# Patient Record
Sex: FEMALE | Race: WHITE | NOT HISPANIC OR LATINO | URBAN - METROPOLITAN AREA
[De-identification: names, ages, dates, MRNs, and addresses within clinical notes are randomized per-mention and may not be internally consistent; named-entity substitution may affect disease eponyms.]

---

## 2017-06-26 PROBLEM — Z00.00 ENCOUNTER FOR PREVENTIVE HEALTH EXAMINATION: Status: ACTIVE | Noted: 2017-06-26

## 2017-09-05 ENCOUNTER — OUTPATIENT (OUTPATIENT)
Dept: OUTPATIENT SERVICES | Facility: HOSPITAL | Age: 42
LOS: 1 days | Discharge: HOME | End: 2017-09-05

## 2017-09-05 DIAGNOSIS — Z12.31 ENCOUNTER FOR SCREENING MAMMOGRAM FOR MALIGNANT NEOPLASM OF BREAST: ICD-10-CM

## 2017-09-20 ENCOUNTER — APPOINTMENT (OUTPATIENT)
Dept: BREAST CENTER | Facility: CLINIC | Age: 42
End: 2017-09-20
Payer: COMMERCIAL

## 2017-09-20 VITALS
HEIGHT: 62 IN | SYSTOLIC BLOOD PRESSURE: 102 MMHG | BODY MASS INDEX: 22.08 KG/M2 | WEIGHT: 120 LBS | DIASTOLIC BLOOD PRESSURE: 60 MMHG

## 2017-09-20 DIAGNOSIS — F17.200 NICOTINE DEPENDENCE, UNSPECIFIED, UNCOMPLICATED: ICD-10-CM

## 2017-09-20 DIAGNOSIS — Z80.0 FAMILY HISTORY OF MALIGNANT NEOPLASM OF DIGESTIVE ORGANS: ICD-10-CM

## 2017-09-20 PROCEDURE — 99213 OFFICE O/P EST LOW 20 MIN: CPT

## 2017-09-20 RX ORDER — BROMPHENIRAMINE MALEATE, PSEUDOEPHEDRINE HYDROCHLORIDE, 2; 30; 10 MG/5ML; MG/5ML; MG/5ML
30-2-10 SYRUP ORAL
Qty: 480 | Refills: 0 | Status: COMPLETED | COMMUNITY
Start: 2017-07-13

## 2017-09-20 RX ORDER — AZITHROMYCIN 250 MG/1
250 TABLET, FILM COATED ORAL
Qty: 6 | Refills: 0 | Status: COMPLETED | COMMUNITY
Start: 2017-07-13

## 2017-09-20 RX ORDER — AMOXICILLIN AND CLAVULANATE POTASSIUM 875; 125 MG/1; MG/1
875-125 TABLET, COATED ORAL
Qty: 20 | Refills: 0 | Status: COMPLETED | COMMUNITY
Start: 2017-04-05

## 2018-03-05 ENCOUNTER — APPOINTMENT (OUTPATIENT)
Dept: BREAST CENTER | Facility: CLINIC | Age: 43
End: 2018-03-05

## 2018-03-07 ENCOUNTER — APPOINTMENT (OUTPATIENT)
Dept: BREAST CENTER | Facility: CLINIC | Age: 43
End: 2018-03-07
Payer: COMMERCIAL

## 2018-03-07 VITALS
OXYGEN SATURATION: 95 % | WEIGHT: 111 LBS | SYSTOLIC BLOOD PRESSURE: 100 MMHG | HEART RATE: 60 BPM | BODY MASS INDEX: 20.43 KG/M2 | HEIGHT: 62 IN | DIASTOLIC BLOOD PRESSURE: 64 MMHG

## 2018-03-07 PROCEDURE — 99213 OFFICE O/P EST LOW 20 MIN: CPT

## 2018-07-24 PROBLEM — Z80.0 FAMILY HISTORY OF COLON CANCER: Status: ACTIVE | Noted: 2017-09-20

## 2018-09-06 ENCOUNTER — FORM ENCOUNTER (OUTPATIENT)
Age: 43
End: 2018-09-06

## 2018-09-07 ENCOUNTER — OUTPATIENT (OUTPATIENT)
Dept: OUTPATIENT SERVICES | Facility: HOSPITAL | Age: 43
LOS: 1 days | Discharge: HOME | End: 2018-09-07

## 2018-09-07 DIAGNOSIS — Z12.31 ENCOUNTER FOR SCREENING MAMMOGRAM FOR MALIGNANT NEOPLASM OF BREAST: ICD-10-CM

## 2018-09-07 DIAGNOSIS — R92.2 INCONCLUSIVE MAMMOGRAM: ICD-10-CM

## 2018-09-12 ENCOUNTER — OTHER (OUTPATIENT)
Age: 43
End: 2018-09-12

## 2018-11-01 ENCOUNTER — APPOINTMENT (OUTPATIENT)
Dept: BREAST CENTER | Facility: CLINIC | Age: 43
End: 2018-11-01
Payer: COMMERCIAL

## 2018-11-01 VITALS
WEIGHT: 111 LBS | HEIGHT: 62 IN | SYSTOLIC BLOOD PRESSURE: 110 MMHG | BODY MASS INDEX: 20.43 KG/M2 | DIASTOLIC BLOOD PRESSURE: 64 MMHG

## 2018-11-01 PROCEDURE — 99212 OFFICE O/P EST SF 10 MIN: CPT

## 2019-03-10 ENCOUNTER — FORM ENCOUNTER (OUTPATIENT)
Age: 44
End: 2019-03-10

## 2019-03-11 ENCOUNTER — OUTPATIENT (OUTPATIENT)
Dept: OUTPATIENT SERVICES | Facility: HOSPITAL | Age: 44
LOS: 1 days | Discharge: HOME | End: 2019-03-11

## 2019-03-11 DIAGNOSIS — R92.8 OTHER ABNORMAL AND INCONCLUSIVE FINDINGS ON DIAGNOSTIC IMAGING OF BREAST: ICD-10-CM

## 2019-03-19 ENCOUNTER — APPOINTMENT (OUTPATIENT)
Dept: BREAST CENTER | Facility: CLINIC | Age: 44
End: 2019-03-19
Payer: COMMERCIAL

## 2019-03-19 VITALS
TEMPERATURE: 98.3 F | HEIGHT: 62 IN | DIASTOLIC BLOOD PRESSURE: 68 MMHG | BODY MASS INDEX: 21.71 KG/M2 | SYSTOLIC BLOOD PRESSURE: 100 MMHG | WEIGHT: 118 LBS

## 2019-03-19 PROCEDURE — 99212 OFFICE O/P EST SF 10 MIN: CPT

## 2019-03-19 NOTE — DATA REVIEWED
[FreeTextEntry1] : EXAM:  MG MAMMO SCREEN BI      \par PROCEDURE DATE:  09/07/2018  \par INTERPRETATION:  HISTORY:\par Patient is 43 years old and is seen for dense breasts. The patient has no \par personal history of cancer.  The patient has a history of right cyst \par aspiration at age 39.   The patient has no family history of breast \par cancer.\par CLINICAL BREAST EXAM:\par The patient reports her last clinical breast exam was performed 4 months \par ago.\par FILMS COMPARED:\par The present examination has been compared to prior imaging studies \par performed at [facility] on 06/30/2016 and \par 09/05/2017.\par MAMMOGRAM FINDINGS:\par The following mammographic views were obtained: bilateral craniocaudal \par and bilateral mediolateral oblique.  Computer-aided detection was \par utilized in the interpretation of this examination.\par The breasts are heterogeneously dense, which may obscure small masses.\par No suspicious masses, calcifications or other abnormalities are seen.\par When compared to prior studies there is no significant change.\par IMPRESSION:\par There is no mammographic evidence of malignancy.\par RECOMMENDATION:\par Unless otherwise indicated by clinical findings, the patient should \par resume annual screening in 1 year.\par ASSESSMENT:\par BI-RADS Category 1:  Negative\par MICHELL HASSAN M.D., ATTENDING RADIOLOGIST\par This document has been electronically signed. Sep  7 2018 10:50AM\par   \par \par   \par \par \par EXAM:  US BREAST COMPLETE BI      \par PROCEDURE DATE:  09/07/2018  \par INTERPRETATION:  Clinical History / Reason for exam: Dense breasts.\par The patient reports her last clinical breast examination was performed: 4 \par months ago.\par Comparisons: 7/7/2016\par Ultrasound:\par Bilateral whole breast ultrasound was performed. \par Within the right breast at the 10:00 axis, 2 cm from nipple, there is a \par hypoechoic oval mass with parallel orientation measuring 1.2 x 1.1 x 0.5 \par cm. Within the left breast at the 1:00-2:00 axis, 6 cm the nipple, there \par is a hypoechoic oval mass with parallel orientation measuring 0.9 x 0.7 x \par 0.4 cm.\par Impression:  \par 1.  Probably benign right breast 10:00, N2 mass for which short interval \par follow-up in 6 months with targeted right breast ultrasound is \par recommended to demonstrate stability.\par 2.  Probably benign left breast 1:00-2:00, N6 mass for which short \par interval follow-up in 6 months with targeted left breast ultrasound is \par recommended to demonstrate stability.\par Recommendation: Follow-up breast ultrasound in 6 months.\par BI-RADS category 3: Probably Benign\par The above findings and recommendations were discussed with the patient at \par the time of the examination. \par MICHELL HASSAN M.D., ATTENDING RADIOLOGIST\par This document has been electronically signed. Sep  7 2018 11:51AM\par   \par \par \par \par EXAM:  US BREAST LIMITED BI      \par PROCEDURE DATE:  03/11/2019  \par INTERPRETATION:  Clinical History / Reason for exam: Follow-up of \par probably benign masses in both breasts\par Comparisons: 9/7/2018\par Ultrasound:\par Bilateral targeted breast ultrasound was performed.\par Within the right breast at the 10:00 axis, 2 cm from nipple, there is a \par stable hypoechoic oval mass with parallel orientation measuring 1.3 x 1.1 \par x 0.5 cm, previously 1.2 x 1.1 x 0.5 cm.\par Within the left breast at the 1:00-2:00 axis, 6 cm the nipple, there is a \par stable hypoechoic oval mass with parallel orientation measuring 0.9 x 0.7 \par x 0.4 cm, previously 0.9 x 0.7 x 0.4 cm.\par Impression:\par Stable ovoid masses in both breasts are probably benign\par Recommendation: Follow-up breast ultrasound in 6 months. The patient will \par be due for a screening mammogram at the same time.\par BI-RADS category 3: Probably Benign\par The above findings and recommendations were discussed with the patient at\par the time of the examination.\par LUPE LAM M.D., ATTENDING RADIOLOGIST\par This document has been electronically signed. Mar 11 2019 12:26PM\par   \par

## 2019-03-19 NOTE — PAST MEDICAL HISTORY
[Menstruating] : The patient is menstruating [Menarche Age ____] : age at menarche was [unfilled] [Definite ___ (Date)] : the last menstrual period was [unfilled] [Total Preg ___] : G[unfilled] [Live Births ___] : P[unfilled]  [Age At Live Birth ___] : Age at live birth: [unfilled] [FreeTextEntry6] : No. [FreeTextEntry7] : Yes; 7 years, stopped 1998 [FreeTextEntry8] : No.

## 2019-03-19 NOTE — HISTORY OF PRESENT ILLNESS
[FreeTextEntry1] : Patient with history of RIght breast benign biopsy in the past; fibrocystic changes.\par Heterogeneously dense breasts.  \par Right breast probably benign hypoechoic oval mass with parallel orientation 10:00 N2, 12 mm seen initially 9/7/18 ultrasound; requiring short term follow up.  \par Left breast probably benign hypoechoic oval mass with parallel orientation 1-2:00 N6, 9 mm seen initially on 9/7/18 ultrasound; requiring short term follow up.\par \par No prior complaints related to the breasts. \par No family history of breast or ovarian cancer. \par \par Federica Model Risk Assessment:\par 5 yr - 1.3 %\par Life - 17.9 %\par \par \par \par

## 2019-03-19 NOTE — ASSESSMENT
[FreeTextEntry1] : 43 year old female with bilateral probably benign masses requiring short term follow up.  She has a history of a benign right breast biopsy in the past demonstrating benign fibrocystic changes.   She has no family history of breast or ovarian cancer and her Federica Model risk assessment for breast cancer is 17.9% in her lifetime.\par \par A bilateral breast ultrasound was performed this past month for short term follow up.  There are stable hypoechoic oval masses, right breast 10:00 N2, 13 mm and left breast 1-2:00 N6, 9 mm, both requiring short term follow up.  \par \par She is here for evaluation of these findings.  At this time, these findings do not present the need for surgical intervention.  She has a benign clinical breast examination and no current complaints related to the breasts. For now, she will need a bilateral diagnostic mammogram and ultrasound for short term follow up, with subsequent follow up clinical breast examination.  I spent a total of 10 minutes of face to face time with this patient, greater than 50% of which was spent in counseling and/or coordination of care.  All of her questions were appropriately answered.\par

## 2019-03-19 NOTE — PHYSICAL EXAM
[No Supraclavicular Adenopathy] : no supraclavicular adenopathy [Examined in the supine and seated position] : examined in the supine and seated position [No dominant masses] : no dominant masses in right breast  [Symmetrical] : symmetrical [No dominant masses] : no dominant masses left breast [No Nipple Retraction] : no left nipple retraction [No Nipple Discharge] : no left nipple discharge [Breast Mass Right Breast ___cm] : no masses [Breast Mass Left Breast ___cm] : no masses [Breast Nipple Inversion] : nipples not inverted [Breast Nipple Retraction] : nipples not retracted [Breast Nipple Flattening] : nipples not flattened [Breast Nipple Fissures] : nipples not fissured [Breast Abnormal Lactation (Galactorrhea)] : no galactorrhea [Breast Abnormal Secretion Bloody Fluid] : no bloody discharge [Breast Abnormal Secretion Serous Fluid] : no serous discharge [Breast Abnormal Secretion Opalescent Fluid] : no milky discharge [No Axillary Lymphadenopathy] : no left axillary lymphadenopathy [No Edema] : no edema [No Rashes] : no rashes

## 2019-09-11 ENCOUNTER — FORM ENCOUNTER (OUTPATIENT)
Age: 44
End: 2019-09-11

## 2019-09-12 ENCOUNTER — OUTPATIENT (OUTPATIENT)
Dept: OUTPATIENT SERVICES | Facility: HOSPITAL | Age: 44
LOS: 1 days | Discharge: HOME | End: 2019-09-12
Payer: COMMERCIAL

## 2019-09-12 DIAGNOSIS — R92.8 OTHER ABNORMAL AND INCONCLUSIVE FINDINGS ON DIAGNOSTIC IMAGING OF BREAST: ICD-10-CM

## 2019-09-12 PROCEDURE — 77066 DX MAMMO INCL CAD BI: CPT | Mod: 26

## 2019-09-12 PROCEDURE — 76642 ULTRASOUND BREAST LIMITED: CPT | Mod: 26,50

## 2019-09-12 PROCEDURE — G0279: CPT | Mod: 26

## 2019-09-17 ENCOUNTER — APPOINTMENT (OUTPATIENT)
Dept: BREAST CENTER | Facility: CLINIC | Age: 44
End: 2019-09-17
Payer: COMMERCIAL

## 2019-09-17 VITALS
SYSTOLIC BLOOD PRESSURE: 120 MMHG | BODY MASS INDEX: 21.71 KG/M2 | DIASTOLIC BLOOD PRESSURE: 80 MMHG | HEIGHT: 62 IN | WEIGHT: 118 LBS | TEMPERATURE: 98.8 F

## 2019-09-17 PROCEDURE — 99212 OFFICE O/P EST SF 10 MIN: CPT

## 2019-09-17 NOTE — ASSESSMENT
[FreeTextEntry1] : Morenita has bilateral breast masses requiring short term follow-up.  They have been stable thus far.  She has a benign CBE. There are no palpable findings, axillary lymphadenopathy, nipple discharge or inversion. I reviewed her recent imaging. \par \par We will schedule her for a bilateral sonogram in March 2020 and f/up after testing.\par \par I spent a total of 10 minutes of face to face time with this patient, greater than 50% of which was spent in counseling and/or coordination of care.  All of her questions were appropriately answered.\par

## 2019-09-17 NOTE — DATA REVIEWED
[FreeTextEntry1] : B/L Dx Mammo & Sono - 09/12/19:\par Breast composition:The breasts are heterogeneously dense, which may obscure small masses. \par Findings: \par Mammogram: \par No suspicious mass, microcalcifications or areas of architectural distortion is seen either breast. The superior right breast asymmetry effaces spot compression and represents benign overlapping fibroglandular tissues. When compared to the previous examinations there is no significant interval change and nothing to suggest malignancy. \par Ultrasound: \par Targeted bilateral breast ultrasound was performed. \par At the right breast 10:00 position 2 cm from the nipple, there is a stable hypoechoic mass measuring 1.0 x 1.3 x 0.4 cm. \par At the left breast 1 to 2:00 position 6 cm from nipple, there is a stable hypoechoic mass measuring 0.7 x 0.9 x 0.4 cm. \par Impression: No mammographic or sonographic evidence of malignancy. Stable bilateral probably benign hypoechoic \par masses as above. Short-term sonographic follow-up of bilateral masses is recommended.\par Recommendation: Follow-up breast ultrasound in 6 months. \par BI-RADS category 3: Probably Benign

## 2020-03-12 ENCOUNTER — FORM ENCOUNTER (OUTPATIENT)
Age: 45
End: 2020-03-12

## 2020-03-13 ENCOUNTER — OUTPATIENT (OUTPATIENT)
Dept: OUTPATIENT SERVICES | Facility: HOSPITAL | Age: 45
LOS: 1 days | Discharge: HOME | End: 2020-03-13
Payer: COMMERCIAL

## 2020-03-13 DIAGNOSIS — R92.8 OTHER ABNORMAL AND INCONCLUSIVE FINDINGS ON DIAGNOSTIC IMAGING OF BREAST: ICD-10-CM

## 2020-03-13 PROCEDURE — 76641 ULTRASOUND BREAST COMPLETE: CPT | Mod: 26,50

## 2020-03-19 ENCOUNTER — APPOINTMENT (OUTPATIENT)
Dept: BREAST CENTER | Facility: CLINIC | Age: 45
End: 2020-03-19

## 2020-03-23 ENCOUNTER — RESULT REVIEW (OUTPATIENT)
Age: 45
End: 2020-03-23

## 2020-03-23 ENCOUNTER — OUTPATIENT (OUTPATIENT)
Dept: OUTPATIENT SERVICES | Facility: HOSPITAL | Age: 45
LOS: 1 days | Discharge: HOME | End: 2020-03-23
Payer: COMMERCIAL

## 2020-03-23 PROCEDURE — 77065 DX MAMMO INCL CAD UNI: CPT | Mod: 26,RT

## 2020-03-23 PROCEDURE — 88305 TISSUE EXAM BY PATHOLOGIST: CPT | Mod: 26

## 2020-03-23 PROCEDURE — 19083 BX BREAST 1ST LESION US IMAG: CPT | Mod: RT

## 2020-03-24 LAB — SURGICAL PATHOLOGY STUDY: SIGNIFICANT CHANGE UP

## 2020-03-25 DIAGNOSIS — N60.31 FIBROSCLEROSIS OF RIGHT BREAST: ICD-10-CM

## 2020-03-25 DIAGNOSIS — N63.10 UNSPECIFIED LUMP IN THE RIGHT BREAST, UNSPECIFIED QUADRANT: ICD-10-CM

## 2020-03-25 DIAGNOSIS — N62 HYPERTROPHY OF BREAST: ICD-10-CM

## 2020-03-25 DIAGNOSIS — R92.0 MAMMOGRAPHIC MICROCALCIFICATION FOUND ON DIAGNOSTIC IMAGING OF BREAST: ICD-10-CM

## 2020-03-25 DIAGNOSIS — N60.21 FIBROADENOSIS OF RIGHT BREAST: ICD-10-CM

## 2020-10-23 ENCOUNTER — OUTPATIENT (OUTPATIENT)
Dept: OUTPATIENT SERVICES | Facility: HOSPITAL | Age: 45
LOS: 1 days | Discharge: HOME | End: 2020-10-23
Payer: COMMERCIAL

## 2020-10-23 ENCOUNTER — RESULT REVIEW (OUTPATIENT)
Age: 45
End: 2020-10-23

## 2020-10-23 ENCOUNTER — NON-APPOINTMENT (OUTPATIENT)
Age: 45
End: 2020-10-23

## 2020-10-23 DIAGNOSIS — R92.8 OTHER ABNORMAL AND INCONCLUSIVE FINDINGS ON DIAGNOSTIC IMAGING OF BREAST: ICD-10-CM

## 2020-10-23 PROCEDURE — G0279: CPT | Mod: 26

## 2020-10-23 PROCEDURE — 76641 ULTRASOUND BREAST COMPLETE: CPT | Mod: 26,50

## 2020-10-23 PROCEDURE — 77066 DX MAMMO INCL CAD BI: CPT | Mod: 26

## 2021-01-12 ENCOUNTER — APPOINTMENT (OUTPATIENT)
Dept: BREAST CENTER | Facility: CLINIC | Age: 46
End: 2021-01-12
Payer: COMMERCIAL

## 2021-01-12 VITALS
BODY MASS INDEX: 21.71 KG/M2 | HEIGHT: 62 IN | TEMPERATURE: 97.6 F | WEIGHT: 118 LBS | SYSTOLIC BLOOD PRESSURE: 130 MMHG | DIASTOLIC BLOOD PRESSURE: 70 MMHG

## 2021-01-12 PROCEDURE — 99213 OFFICE O/P EST LOW 20 MIN: CPT

## 2021-01-12 PROCEDURE — 99072 ADDL SUPL MATRL&STAF TM PHE: CPT

## 2021-01-12 NOTE — ASSESSMENT
[FreeTextEntry1] : DOLORES is a tmi 45 year old patient who presented today in follow up for a history of fibrocystic breast changes.  \par She has been doing well with no new breast related complaints. \par Imaging was done recently which revealed no mammographic evidence of malignancy. Bilateral hypoechoic masses have demonstrated long-term stability and are therefore benign. Probable complicated cyst at the left breast 12 to 1:00 position for which short-term sonographic follow-up is recommended, as detailed above. \par Physical exam was unrevealing today.\par \par Imaging with a left breast sonogram will be due in April 2021, and that will be scheduled today. \par We will plan to discuss these results via telephone.\par She will likely need a bilateral diagnostic mammo / sono in October 2021\par She will return for follow-up and clinical breast exam in following imaging in October 2021 with Dr. Taylor.\par \par I spent a total of 15 minutes of face to face time with this patient, greater than 50% of which was spent in counseling and/or coordination of care.\par All of her questions were appropriately answered.\par She knows to call with any concerns.\par \par \par \par

## 2021-01-12 NOTE — DATA REVIEWED
[FreeTextEntry1] : B/L Dx Mammo & Sono - 10/23/2020:\par Breast composition:The breasts are heterogeneously dense, which may obscure small masses.\par \par Findings:\par \par Mammogram:\par \par No suspicious mass, microcalcifications or areas of architectural distortion is seen either breast. When compared to the previous examinations there is no significant interval change and nothing to suggest malignancy.\par \par Ultrasound:\par \par Bilateral whole breast ultrasound was performed part of a dense breast screening.\par \par Right breast:\par At the 10:00 position 2 cm from the nipple, there is a stable hypoechoic mass measuring 1.0 x 1.3 x 0.4 cm. This is unchanged compared to exams dating back to 2018 and is therefore benign.\par \par At the 10:00 position 7 cm from the nipple, the previously biopsied complex solid and cystic masses no longer seen on the current exam.\par \par No additional solid or cystic masses. Benign cyst noted in the upper outer quadrant. There is no axillary adenopathy.\par \par Left breast:\par At the 1 to 2:00 position 5 cm from the nipple, there is a stable hypoechoic mass measuring 0.8 x 0.8 x 0.3 cm. This is unchanged compared to exams dating back to 2018 is therefore benign.\par \par At the 12 to 1:00 position 2 cm from the nipple, there is a probable complicated cysts measuring 0.5 x 0.7 x 0.4 cm. This is probably benign and short-term follow-up is recommended.\par \par No additional solid or cystic masses. No axillary adenopathy.\par \par Impression: No mammographic evidence of malignancy. Bilateral hypoechoic masses have demonstrated long-term stability and are therefore benign. Probable complicated cyst at the left breast 12 to 1:00 position for which short-term sonographic follow-up is recommended.\par \par Recommendation: Follow-up breast ultrasound in 6 months.\par \par BI-RADS category 3: Probably Benign

## 2021-01-12 NOTE — HISTORY OF PRESENT ILLNESS
[FreeTextEntry1] : Patient with history of RIght breast benign biopsy in the past; fibrocystic changes.\par Heterogeneously dense breasts.  \par Right breast probably benign hypoechoic oval mass with parallel orientation 10:00 N2, 12 mm seen initially 9/7/18 ultrasound; requiring short term follow up.  \par Left breast probably benign hypoechoic oval mass with parallel orientation 1-2:00 N6, 9 mm seen initially on 9/7/18 ultrasound; requiring short term follow up.\par \par No prior complaints related to the breasts. \par No family history of breast or ovarian cancer. \par (+) colon cancer - mother. \par \par Federica Model Risk Assessment:\par 5 yr - 1.3 %\par Life - 17.9 %\par \par DOLORES BOND is a 45 year old female patient who presents today in follow up for fibrocystic breast changes.\par Since her last visit, she has no new breast related complaints. \par Imaging was done on 10/23/2020, which revealed no mammographic evidence of malignancy. Bilateral hypoechoic masses have demonstrated long-term stability and are therefore benign. Probable complicated cyst at the left breast 12 to 1:00 position for which short-term sonographic follow-up is recommended.\par \par She presents today for evaluation and imaging review.

## 2021-01-12 NOTE — PHYSICAL EXAM
[Normocephalic] : normocephalic [Atraumatic] : atraumatic [No Supraclavicular Adenopathy] : no supraclavicular adenopathy [No dominant masses] : no dominant masses in right breast  [No dominant masses] : no dominant masses left breast [No Nipple Discharge] : no left nipple discharge [No Rashes] : no rashes [No Ulceration] : no ulceration [Breast Nipple Inversion] : nipples not inverted [Breast Nipple Retraction] : nipples not retracted [de-identified] : B/L fibroglandular nodular densities. [de-identified] : No axillary lymphadenopathy appreciated. [de-identified] : No axillary lymphadenopathy appreciated.

## 2021-04-26 ENCOUNTER — OUTPATIENT (OUTPATIENT)
Dept: OUTPATIENT SERVICES | Facility: HOSPITAL | Age: 46
LOS: 1 days | Discharge: HOME | End: 2021-04-26
Payer: COMMERCIAL

## 2021-04-26 ENCOUNTER — RESULT REVIEW (OUTPATIENT)
Age: 46
End: 2021-04-26

## 2021-04-26 DIAGNOSIS — R92.8 OTHER ABNORMAL AND INCONCLUSIVE FINDINGS ON DIAGNOSTIC IMAGING OF BREAST: ICD-10-CM

## 2021-04-26 PROCEDURE — 76642 ULTRASOUND BREAST LIMITED: CPT | Mod: 26,LT

## 2021-04-30 ENCOUNTER — NON-APPOINTMENT (OUTPATIENT)
Age: 46
End: 2021-04-30

## 2021-05-05 ENCOUNTER — APPOINTMENT (OUTPATIENT)
Dept: BREAST CENTER | Facility: CLINIC | Age: 46
End: 2021-05-05

## 2021-11-01 ENCOUNTER — RESULT REVIEW (OUTPATIENT)
Age: 46
End: 2021-11-01

## 2021-11-01 ENCOUNTER — NON-APPOINTMENT (OUTPATIENT)
Age: 46
End: 2021-11-01

## 2021-11-01 ENCOUNTER — OUTPATIENT (OUTPATIENT)
Dept: OUTPATIENT SERVICES | Facility: HOSPITAL | Age: 46
LOS: 1 days | Discharge: HOME | End: 2021-11-01
Payer: COMMERCIAL

## 2021-11-01 DIAGNOSIS — R92.8 OTHER ABNORMAL AND INCONCLUSIVE FINDINGS ON DIAGNOSTIC IMAGING OF BREAST: ICD-10-CM

## 2021-11-01 PROCEDURE — 77066 DX MAMMO INCL CAD BI: CPT | Mod: 26

## 2021-11-01 PROCEDURE — G0279: CPT | Mod: 26

## 2021-11-01 PROCEDURE — 76642 ULTRASOUND BREAST LIMITED: CPT | Mod: 26,LT

## 2021-11-09 ENCOUNTER — APPOINTMENT (OUTPATIENT)
Dept: BREAST CENTER | Facility: CLINIC | Age: 46
End: 2021-11-09
Payer: COMMERCIAL

## 2021-11-09 VITALS
WEIGHT: 118 LBS | BODY MASS INDEX: 21.71 KG/M2 | SYSTOLIC BLOOD PRESSURE: 115 MMHG | HEIGHT: 62 IN | DIASTOLIC BLOOD PRESSURE: 70 MMHG | TEMPERATURE: 98.3 F

## 2021-11-09 DIAGNOSIS — R92.8 OTHER ABNORMAL AND INCONCLUSIVE FINDINGS ON DIAGNOSTIC IMAGING OF BREAST: ICD-10-CM

## 2021-11-09 PROCEDURE — 99212 OFFICE O/P EST SF 10 MIN: CPT

## 2021-11-09 NOTE — ASSESSMENT
[FreeTextEntry1] : DOLORES is a tim 46 year old patient who presented today in follow up for a history of fibrocystic breast changes.  \par She has been doing well with no new breast related complaints. \par Imaging was done on 11/01/2021, which revealed no mammographic evidence of malignancy. Targeted unilateral left breast ultrasound was performed. At 12-1 o'clock N2 there is a stable hypoechoic mass measuring 0.7 x 0.6 x 0.4 cm. Short interval follow-up recommended, as detailed above. \par Physical exam was unrevealing today.\par \par Imaging with a left breast sonogram will be due in May 2022, and that will be scheduled today. \par She will return for follow-up and clinical breast exam following imaging.\par \par The patient was informed that Dr. Deborah Taylor will no longer be practicing here as of the end of August 2021; her care will be continued with the practice.\par \par I spent a total of 15 minutes of face to face time with this patient, greater than 50% of which was spent in counseling and/or coordination of care.\par All of her questions were appropriately answered.\par She knows to call with any concerns.\par \par \par \par

## 2021-11-09 NOTE — HISTORY OF PRESENT ILLNESS
[FreeTextEntry1] : Patient with history of RIght breast benign biopsy in the past; fibrocystic changes.\par Heterogeneously dense breasts.  \par Right breast probably benign hypoechoic oval mass with parallel orientation 10:00 N2, 12 mm seen initially 9/7/18 ultrasound; requiring short term follow up.  \par Left breast probably benign hypoechoic oval mass with parallel orientation 1-2:00 N6, 9 mm seen initially on 9/7/18 ultrasound; requiring short term follow up.\par \par No prior complaints related to the breasts. \par No family history of breast or ovarian cancer. \par (+) colon cancer - mother. \par \par Federica Model Risk Assessment:\par 5 yr - 1.3 %\par Life - 17.9 %\par \par DOLORES BOND is a 46 year old female patient who presents today in follow up for fibrocystic breast changes.\par Since her last visit, she has no new breast related complaints. \par Imaging was done on 11/01/2021, which revealed no mammographic evidence of malignancy. Targeted unilateral left breast ultrasound was performed. At 12-1 o'clock N2 there is a stable hypoechoic mass measuring 0.7 x 0.6 x 0.4 cm. Short interval follow-up recommended.\par \par She presents today for evaluation and imaging review.

## 2021-11-09 NOTE — PHYSICAL EXAM
[Normocephalic] : normocephalic [Atraumatic] : atraumatic [No Supraclavicular Adenopathy] : no supraclavicular adenopathy [No dominant masses] : no dominant masses in right breast  [No dominant masses] : no dominant masses left breast [No Nipple Discharge] : no left nipple discharge [No Rashes] : no rashes [No Ulceration] : no ulceration [Breast Nipple Inversion] : nipples not inverted [Breast Nipple Retraction] : nipples not retracted [de-identified] : B/L dense fibroglandular nodular tissue; particularly in the UOQ. [de-identified] : No axillary lymphadenopathy appreciated. [de-identified] : No axillary lymphadenopathy appreciated.

## 2022-05-03 ENCOUNTER — RESULT REVIEW (OUTPATIENT)
Age: 47
End: 2022-05-03

## 2022-05-03 ENCOUNTER — OUTPATIENT (OUTPATIENT)
Dept: OUTPATIENT SERVICES | Facility: HOSPITAL | Age: 47
LOS: 1 days | Discharge: HOME | End: 2022-05-03
Payer: COMMERCIAL

## 2022-05-03 DIAGNOSIS — R92.8 OTHER ABNORMAL AND INCONCLUSIVE FINDINGS ON DIAGNOSTIC IMAGING OF BREAST: ICD-10-CM

## 2022-05-03 PROCEDURE — 76642 ULTRASOUND BREAST LIMITED: CPT | Mod: 26,LT

## 2022-05-04 ENCOUNTER — NON-APPOINTMENT (OUTPATIENT)
Age: 47
End: 2022-05-04

## 2022-05-05 ENCOUNTER — NON-APPOINTMENT (OUTPATIENT)
Age: 47
End: 2022-05-05

## 2022-05-11 ENCOUNTER — APPOINTMENT (OUTPATIENT)
Dept: BREAST CENTER | Facility: CLINIC | Age: 47
End: 2022-05-11

## 2022-11-07 ENCOUNTER — RESULT REVIEW (OUTPATIENT)
Age: 47
End: 2022-11-07

## 2022-11-07 ENCOUNTER — OUTPATIENT (OUTPATIENT)
Dept: OUTPATIENT SERVICES | Facility: HOSPITAL | Age: 47
LOS: 1 days | Discharge: HOME | End: 2022-11-07

## 2022-11-07 DIAGNOSIS — R92.8 OTHER ABNORMAL AND INCONCLUSIVE FINDINGS ON DIAGNOSTIC IMAGING OF BREAST: ICD-10-CM

## 2022-11-07 PROCEDURE — 77066 DX MAMMO INCL CAD BI: CPT | Mod: 26

## 2022-11-07 PROCEDURE — G0279: CPT | Mod: 26

## 2022-11-07 PROCEDURE — 76641 ULTRASOUND BREAST COMPLETE: CPT | Mod: 26,50

## 2023-11-08 ENCOUNTER — RESULT REVIEW (OUTPATIENT)
Age: 48
End: 2023-11-08

## 2023-11-08 ENCOUNTER — OUTPATIENT (OUTPATIENT)
Dept: OUTPATIENT SERVICES | Facility: HOSPITAL | Age: 48
LOS: 1 days | End: 2023-11-08
Payer: COMMERCIAL

## 2023-11-08 DIAGNOSIS — R92.2 INCONCLUSIVE MAMMOGRAM: ICD-10-CM

## 2023-11-08 DIAGNOSIS — Z12.31 ENCOUNTER FOR SCREENING MAMMOGRAM FOR MALIGNANT NEOPLASM OF BREAST: ICD-10-CM

## 2023-11-08 PROCEDURE — 77067 SCR MAMMO BI INCL CAD: CPT

## 2023-11-08 PROCEDURE — 77063 BREAST TOMOSYNTHESIS BI: CPT

## 2023-11-08 PROCEDURE — 77063 BREAST TOMOSYNTHESIS BI: CPT | Mod: 26

## 2023-11-08 PROCEDURE — 76641 ULTRASOUND BREAST COMPLETE: CPT | Mod: 26,50

## 2023-11-08 PROCEDURE — 77067 SCR MAMMO BI INCL CAD: CPT | Mod: 26

## 2023-11-08 PROCEDURE — 76641 ULTRASOUND BREAST COMPLETE: CPT | Mod: 50

## 2023-11-09 DIAGNOSIS — Z12.31 ENCOUNTER FOR SCREENING MAMMOGRAM FOR MALIGNANT NEOPLASM OF BREAST: ICD-10-CM

## 2023-11-09 DIAGNOSIS — R92.2 INCONCLUSIVE MAMMOGRAM: ICD-10-CM

## 2023-11-14 ENCOUNTER — APPOINTMENT (OUTPATIENT)
Dept: BREAST CENTER | Facility: CLINIC | Age: 48
End: 2023-11-14
Payer: COMMERCIAL

## 2023-11-14 DIAGNOSIS — N63.20 UNSPECIFIED LUMP IN THE RIGHT BREAST, UNSPECIFIED QUADRANT: ICD-10-CM

## 2023-11-14 DIAGNOSIS — R92.2 INCONCLUSIVE MAMMOGRAM: ICD-10-CM

## 2023-11-14 DIAGNOSIS — R92.30 INCONCLUSIVE MAMMOGRAM: ICD-10-CM

## 2023-11-14 DIAGNOSIS — N63.10 UNSPECIFIED LUMP IN THE RIGHT BREAST, UNSPECIFIED QUADRANT: ICD-10-CM

## 2023-11-14 PROCEDURE — 99214 OFFICE O/P EST MOD 30 MIN: CPT

## 2024-05-10 ENCOUNTER — APPOINTMENT (OUTPATIENT)
Dept: BREAST CENTER | Facility: CLINIC | Age: 49
End: 2024-05-10
Payer: COMMERCIAL

## 2024-05-10 VITALS
DIASTOLIC BLOOD PRESSURE: 80 MMHG | WEIGHT: 110 LBS | SYSTOLIC BLOOD PRESSURE: 136 MMHG | HEART RATE: 90 BPM | BODY MASS INDEX: 20.24 KG/M2 | HEIGHT: 62 IN

## 2024-05-10 DIAGNOSIS — N60.19 DIFFUSE CYSTIC MASTOPATHY OF UNSPECIFIED BREAST: ICD-10-CM

## 2024-05-10 PROCEDURE — 99213 OFFICE O/P EST LOW 20 MIN: CPT

## 2024-05-10 NOTE — DATA REVIEWED
[FreeTextEntry1] : [id number]     EXAM:  MG US BREAST COMPLETE BI   ORDERED BY: JESSEE ANN  PROCEDURE DATE:  11/08/2023    INTERPRETATION:  HISTORY: Bilateral MG MAMMO SCREEN W MICHELLE BI#, Bilateral US BREAST COMPLETE BI was performed. Patient is 48 years old and is seen for screening. The patient has no personal history of cancer. The patient has a history of right cyst aspiration at age 39. The patient has no family history of breast cancer.  RISK ASSESSMENT: NCI Lifetime Risk: 13.2 Tyrer-Cuzick Lifetime Risk: 18.2  CLINICAL BREAST EXAM: The patient reports their last clinical breast exam was performed within the past year.  COMPARISON STUDIES: The present examination has been compared to prior imaging studies performed at [facility] on 09/07/2018, 09/12/2019, 03/23/2020, 10/23/2020, 04/26/2021, 11/01/2021, 05/03/2022 and 11/07/2022.  MAMMOGRAM FINDINGS: Mammography was performed including the following views: bilateral craniocaudal with tomosynthesis, bilateral mediolateral oblique with tomosynthesis.  The examination includes digital synthetic 2D and digital tomosynthesis 3D images. Additional imaging analysis was performed using CAD (computer-aided detection) software.  The breasts are heterogeneously dense, which may obscure small masses.  Finding 1:  No suspicious mass, grouping of calcifications, or other abnormality is identified.  ULTRASOUND FINDINGS: Finding 2:  Bilateral whole breast ultrasound was performed. There are several oval masses seen in both breasts.  IMPRESSION: Finding 1:  No mammographic or sonographic evidence of malignancy.  Finding 2:  Oval masses in both breasts are benign finding.  RECOMMENDATION: Unless otherwise indicated by clinical findings, annual screening mammography recommended.  ASSESSMENT: BI-RADS Category 2:  Benign

## 2024-05-10 NOTE — ASSESSMENT
[FreeTextEntry1] : Patient is a 48F with bilateral FCC.  She most recently underwent bilateral scg mmg/us in 11/2023 which showed several bilateral oval masses (BIRADS 2) with annual screening recommended.  We discussed her prior history.  We discussed breast cysts. They are not pre-malignant nor do they have malignant potential and are hormonally influenced. They may grow or shrink in size as well as resolve spontaneously and there is usually no intervention unless they are symptomatic. In several large studies, patients with breast cysts and a positive family history had a higher relative risk of breast cancer (from 1.5 to 3).  We discussed breast density. Increasing breast density has been found to increase ones risk of breast cancer, but at this time, there is no clear indication for additional imaging in this setting, as both US and MRI have not been found to improve survival. One can consider bilateral screening US. However, out of 1000 women screened, the use of routine US will only identify an additional 3-5 cancers. The use of US was found to increase the likelihood of undergoing more imaging and more biopsies. She does have dense breasts. We have decided to proceed with screening bilateral breast US in the future with her screening mammograms.   Imaging with bilateral screening mammogram and sonogram will be due in November 2024, and that will be scheduled today.  She will return for follow-up and clinical breast exam following imaging.  I spent a total of 20 minutes of face to face time with this patient, greater than 50% of which was spent in counseling and/or coordination of care. All of her questions were appropriately answered. She knows to call with any concerns.

## 2024-05-10 NOTE — HISTORY OF PRESENT ILLNESS
[FreeTextEntry1] : DOLORES BOND is a 48 year old female patient who presents today for follow up for fibrocystic breast changes.  No family history of breast or ovarian cancer.  (+) colon cancer - mother.   Her history is as follows:  Patient with history of Right breast benign biopsy in the past; fibrocystic changes. Right breast probably benign hypoechoic oval mass with parallel orientation 10:00 N2, 12 mm seen initially 9/7/18 ultrasound; requiring short term follow up.   Left breast probably benign hypoechoic oval mass with parallel orientation 1-2:00 N6, 9 mm seen initially on 9/7/18 ultrasound; requiring short term follow up.  Her most recent imaging is as follows: 11/08/2023 b/l mammo and US--> BIRADS 2 -breasts are heterogeneously dense -several oval masses seen in both breasts. Rec annual screening  Denies any current complaints. No new changes to her breasts.

## 2024-07-24 PROBLEM — Z87.891 FORMER SMOKER: Status: ACTIVE | Noted: 2024-07-24

## 2024-07-24 NOTE — PAST MEDICAL HISTORY
[Menstruating] : The patient is menstruating [Definite ___ (Date)] : the last menstrual period was [unfilled] [Regular Cycle Intervals] : have been regular [Total Preg ___] : G[unfilled] [Abortions ___] : Abortions:[unfilled] [Living ___] : Living: [unfilled]

## 2024-07-24 NOTE — OB HISTORY
[Total Preg ___] : : [unfilled] [Abortions ___] : [unfilled] (abortions) [Living ___] : [unfilled] (living) [Vaginal ___] : [unfilled] vaginal delivery(s) [Definite ___ (Date)] : the last menstrual period was [unfilled]

## 2024-07-24 NOTE — HISTORY OF PRESENT ILLNESS
[FreeTextEntry1] : 49 year old, para 1-0-1-1,  x 1, LMP 24, patient of Dr. Guillermo.  She c/o a "cyst" that she could feel protruding through the vagina, but that the cyst has since "popped".  She has been evaluated by Dr. Guillermo where pelvic US was performed.  Patient presents today for 2nd opinion.

## 2024-07-30 ENCOUNTER — APPOINTMENT (OUTPATIENT)
Dept: GYNECOLOGIC ONCOLOGY | Facility: CLINIC | Age: 49
End: 2024-07-30

## 2024-07-30 DIAGNOSIS — Z87.891 PERSONAL HISTORY OF NICOTINE DEPENDENCE: ICD-10-CM

## 2024-09-06 ENCOUNTER — APPOINTMENT (OUTPATIENT)
Dept: UROGYNECOLOGY | Facility: CLINIC | Age: 49
End: 2024-09-06

## 2024-11-13 ENCOUNTER — RESULT REVIEW (OUTPATIENT)
Age: 49
End: 2024-11-13

## 2024-11-13 ENCOUNTER — OUTPATIENT (OUTPATIENT)
Dept: OUTPATIENT SERVICES | Facility: HOSPITAL | Age: 49
LOS: 1 days | End: 2024-11-13
Payer: COMMERCIAL

## 2024-11-13 DIAGNOSIS — R92.2 INCONCLUSIVE MAMMOGRAM: ICD-10-CM

## 2024-11-13 DIAGNOSIS — Z12.31 ENCOUNTER FOR SCREENING MAMMOGRAM FOR MALIGNANT NEOPLASM OF BREAST: ICD-10-CM

## 2024-11-13 PROCEDURE — 77067 SCR MAMMO BI INCL CAD: CPT

## 2024-11-13 PROCEDURE — 77067 SCR MAMMO BI INCL CAD: CPT | Mod: 26

## 2024-11-13 PROCEDURE — 77063 BREAST TOMOSYNTHESIS BI: CPT | Mod: 26

## 2024-11-13 PROCEDURE — 76641 ULTRASOUND BREAST COMPLETE: CPT | Mod: 26,50

## 2024-11-13 PROCEDURE — 76641 ULTRASOUND BREAST COMPLETE: CPT | Mod: 50

## 2024-11-13 PROCEDURE — 77063 BREAST TOMOSYNTHESIS BI: CPT

## 2024-11-14 DIAGNOSIS — Z12.31 ENCOUNTER FOR SCREENING MAMMOGRAM FOR MALIGNANT NEOPLASM OF BREAST: ICD-10-CM

## 2024-11-14 DIAGNOSIS — R92.2 INCONCLUSIVE MAMMOGRAM: ICD-10-CM

## 2024-12-04 ENCOUNTER — APPOINTMENT (OUTPATIENT)
Dept: BREAST CENTER | Facility: CLINIC | Age: 49
End: 2024-12-04
Payer: COMMERCIAL

## 2024-12-04 VITALS
HEIGHT: 62 IN | BODY MASS INDEX: 20.24 KG/M2 | SYSTOLIC BLOOD PRESSURE: 176 MMHG | DIASTOLIC BLOOD PRESSURE: 90 MMHG | WEIGHT: 110 LBS

## 2024-12-04 DIAGNOSIS — N60.19 DIFFUSE CYSTIC MASTOPATHY OF UNSPECIFIED BREAST: ICD-10-CM

## 2024-12-04 DIAGNOSIS — R92.30 DENSE BREASTS, UNSPECIFIED: ICD-10-CM

## 2024-12-04 DIAGNOSIS — N63.20 UNSPECIFIED LUMP IN THE RIGHT BREAST, UNSPECIFIED QUADRANT: ICD-10-CM

## 2024-12-04 DIAGNOSIS — R92.8 OTHER ABNORMAL AND INCONCLUSIVE FINDINGS ON DIAGNOSTIC IMAGING OF BREAST: ICD-10-CM

## 2024-12-04 DIAGNOSIS — N63.10 UNSPECIFIED LUMP IN THE RIGHT BREAST, UNSPECIFIED QUADRANT: ICD-10-CM

## 2024-12-04 PROCEDURE — 99213 OFFICE O/P EST LOW 20 MIN: CPT

## 2025-02-24 ENCOUNTER — OUTPATIENT (OUTPATIENT)
Dept: OUTPATIENT SERVICES | Facility: HOSPITAL | Age: 50
LOS: 1 days | End: 2025-02-24
Payer: COMMERCIAL

## 2025-02-24 DIAGNOSIS — Z00.8 ENCOUNTER FOR OTHER GENERAL EXAMINATION: ICD-10-CM

## 2025-02-24 DIAGNOSIS — N83.202 UNSPECIFIED OVARIAN CYST, LEFT SIDE: ICD-10-CM

## 2025-02-24 PROCEDURE — A9579: CPT

## 2025-02-24 PROCEDURE — 72197 MRI PELVIS W/O & W/DYE: CPT | Mod: 26

## 2025-02-24 PROCEDURE — 72197 MRI PELVIS W/O & W/DYE: CPT

## 2025-02-25 DIAGNOSIS — N83.202 UNSPECIFIED OVARIAN CYST, LEFT SIDE: ICD-10-CM

## 2025-05-20 ENCOUNTER — RESULT REVIEW (OUTPATIENT)
Age: 50
End: 2025-05-20

## 2025-05-20 ENCOUNTER — OUTPATIENT (OUTPATIENT)
Dept: OUTPATIENT SERVICES | Facility: HOSPITAL | Age: 50
LOS: 1 days | End: 2025-05-20
Payer: COMMERCIAL

## 2025-05-20 DIAGNOSIS — R92.8 OTHER ABNORMAL AND INCONCLUSIVE FINDINGS ON DIAGNOSTIC IMAGING OF BREAST: ICD-10-CM

## 2025-05-20 DIAGNOSIS — N60.19 DIFFUSE CYSTIC MASTOPATHY OF UNSPECIFIED BREAST: ICD-10-CM

## 2025-05-20 PROCEDURE — 76642 ULTRASOUND BREAST LIMITED: CPT | Mod: 26,50

## 2025-05-20 PROCEDURE — 76642 ULTRASOUND BREAST LIMITED: CPT | Mod: 50

## 2025-05-21 DIAGNOSIS — R92.8 OTHER ABNORMAL AND INCONCLUSIVE FINDINGS ON DIAGNOSTIC IMAGING OF BREAST: ICD-10-CM

## 2025-05-28 ENCOUNTER — APPOINTMENT (OUTPATIENT)
Dept: BREAST CENTER | Facility: CLINIC | Age: 50
End: 2025-05-28
Payer: COMMERCIAL

## 2025-05-28 ENCOUNTER — NON-APPOINTMENT (OUTPATIENT)
Age: 50
End: 2025-05-28

## 2025-05-28 VITALS
BODY MASS INDEX: 20.24 KG/M2 | HEIGHT: 62 IN | DIASTOLIC BLOOD PRESSURE: 74 MMHG | WEIGHT: 110 LBS | SYSTOLIC BLOOD PRESSURE: 134 MMHG | HEART RATE: 52 BPM

## 2025-05-28 DIAGNOSIS — R92.30 DENSE BREASTS, UNSPECIFIED: ICD-10-CM

## 2025-05-28 DIAGNOSIS — N60.19 DIFFUSE CYSTIC MASTOPATHY OF UNSPECIFIED BREAST: ICD-10-CM

## 2025-05-28 DIAGNOSIS — R92.8 OTHER ABNORMAL AND INCONCLUSIVE FINDINGS ON DIAGNOSTIC IMAGING OF BREAST: ICD-10-CM

## 2025-05-28 PROCEDURE — 99213 OFFICE O/P EST LOW 20 MIN: CPT
